# Patient Record
Sex: MALE | Race: WHITE | NOT HISPANIC OR LATINO | Employment: FULL TIME | URBAN - METROPOLITAN AREA
[De-identification: names, ages, dates, MRNs, and addresses within clinical notes are randomized per-mention and may not be internally consistent; named-entity substitution may affect disease eponyms.]

---

## 2021-08-01 ENCOUNTER — APPOINTMENT (OUTPATIENT)
Dept: GENERAL RADIOLOGY | Facility: HOSPITAL | Age: 26
End: 2021-08-01

## 2021-08-01 ENCOUNTER — HOSPITAL ENCOUNTER (EMERGENCY)
Facility: HOSPITAL | Age: 26
Discharge: HOME | End: 2021-08-01
Attending: EMERGENCY MEDICINE | Admitting: EMERGENCY MEDICINE

## 2021-08-01 VITALS
HEIGHT: 70 IN | DIASTOLIC BLOOD PRESSURE: 73 MMHG | BODY MASS INDEX: 25.77 KG/M2 | WEIGHT: 180 LBS | RESPIRATION RATE: 18 BRPM | SYSTOLIC BLOOD PRESSURE: 127 MMHG | TEMPERATURE: 98.2 F | HEART RATE: 74 BPM | OXYGEN SATURATION: 96 %

## 2021-08-01 DIAGNOSIS — S63.502A SPRAIN OF LEFT WRIST, INITIAL ENCOUNTER: Primary | ICD-10-CM

## 2021-08-01 PROCEDURE — 99283 EMERGENCY DEPT VISIT LOW MDM: CPT

## 2021-08-01 PROCEDURE — 73110 X-RAY EXAM OF WRIST: CPT

## 2021-08-01 RX ORDER — IBUPROFEN 400 MG/1
800 TABLET ORAL ONCE
Status: COMPLETED | OUTPATIENT
Start: 2021-08-01 | End: 2021-08-01

## 2021-08-01 RX ORDER — IBUPROFEN 800 MG/1
800 TABLET ORAL
Qty: 15 TABLET | Refills: 0 | Status: SHIPPED | OUTPATIENT
Start: 2021-08-01

## 2021-08-01 RX ADMIN — IBUPROFEN 800 MG: 400 TABLET ORAL at 20:48

## 2021-08-02 NOTE — ED PROVIDER NOTES
Subjective   Julio C Becerra is a 26-year-old male that presents the emergency department for complaints of left wrist pain.  Patient explains that he was using a piece of machinery.  The machinery kicked back to and caused the patient to fall.  Patient attempted to break his fall with his left wrist.  Patient complains of pain to his left wrist.  Patient denies any other injuries.  He denies hitting his head or any loss of consciousness negative for neck pain, back pain.      History provided by:  Patient   used: No    Wrist Injury  Location:  Wrist  Wrist location:  L wrist  Injury: yes    Mechanism of injury: fall    Fall:     Impact surface:  Hard floor    Point of impact:  Outstretched arms  Pain details:     Quality:  Throbbing    Timing:  Constant    Progression:  Worsening  Handedness:  Right-handed  Associated symptoms: decreased range of motion and swelling    Associated symptoms: no back pain, no neck pain, no numbness and no tingling        Review of Systems   Musculoskeletal: Negative for back pain and neck pain.        Lt wrist pain   Neurological: Negative for weakness and numbness.   All other systems reviewed and are negative.      No past medical history on file.    No Known Allergies    No past surgical history on file.    No family history on file.    Social History     Socioeconomic History   • Marital status: Single     Spouse name: Not on file   • Number of children: Not on file   • Years of education: Not on file   • Highest education level: Not on file   Tobacco Use   • Smoking status: Never Smoker   Substance and Sexual Activity   • Alcohol use: No   • Drug use: No           Objective   Physical Exam  Vitals and nursing note reviewed.   Constitutional:       Appearance: Normal appearance. He is well-developed. He is not ill-appearing or toxic-appearing.   HENT:      Head: Normocephalic and atraumatic.      Mouth/Throat:      Mouth: Mucous membranes are moist.   Eyes:       General: Lids are normal.      Conjunctiva/sclera: Conjunctivae normal.   Neck:      Trachea: Trachea normal.   Cardiovascular:      Rate and Rhythm: Regular rhythm.      Pulses: Normal pulses.      Heart sounds: Normal heart sounds.   Pulmonary:      Effort: Pulmonary effort is normal. No respiratory distress.      Breath sounds: Normal breath sounds. No decreased breath sounds, wheezing, rhonchi or rales.   Musculoskeletal:      Left wrist: Tenderness present. No swelling, deformity or snuff box tenderness. Decreased range of motion. Normal pulse.      Cervical back: Full passive range of motion without pain and normal range of motion.   Skin:     General: Skin is warm and dry.      Findings: No rash.   Neurological:      Mental Status: He is alert and oriented to person, place, and time.      Cranial Nerves: No cranial nerve deficit.   Psychiatric:         Speech: Speech normal.         Behavior: Behavior normal. Behavior is cooperative.         Procedures           ED Course  ED Course as of Aug 01 2206   Sun Aug 01, 2021   2034 Patient to wear cock-up splint as applied.  Patient to rest, ice, compression and elevate extremity.  Patient to take ibuprofen as ordered.  Patient to follow-up with Ortho as needed.  Patient agrees with treatment plan and verbalized understanding.    [KG]      ED Course User Index  [KG] Aura Lara, APRN                No results found for this or any previous visit (from the past 24 hour(s)).  Note: In addition to lab results from this visit, the labs listed above may include labs taken at another facility or during a different encounter within the last 24 hours. Please correlate lab times with ED admission and discharge times for further clarification of the services performed during this visit.    XR Wrist 3+ View Left   Preliminary Result   No acute bony abnormality.                Vitals:    08/01/21 1702   BP: 127/73   BP Location: Right arm   Patient Position: Sitting  "  Pulse: 74   Resp: 18   Temp: 98.2 °F (36.8 °C)   TempSrc: Oral   SpO2: 96%   Weight: 81.6 kg (180 lb)   Height: 177.8 cm (70\")     Medications   ibuprofen (ADVIL,MOTRIN) tablet 800 mg (800 mg Oral Given 8/1/21 2048)     ECG/EMG Results (last 24 hours)     ** No results found for the last 24 hours. **        No orders to display                                  MDM    Final diagnoses:   Sprain of left wrist, initial encounter       ED Disposition  ED Disposition     ED Disposition Condition Comment    Discharge Stable           Abisai Willis MD  [address] [address]  [phone]               Medication List      New Prescriptions    ibuprofen 800 MG tablet  Commonly known as: ADVIL,MOTRIN  Take 1 tablet by mouth 3 (Three) Times a Day With Meals.           Where to Get Your Medications      You can get these medications from any pharmacy    Bring a paper prescription for each of these medications  · ibuprofen 800 MG tablet          Aura Lara, APRN  08/01/21 2206    "